# Patient Record
Sex: MALE | Race: WHITE | Employment: OTHER | URBAN - METROPOLITAN AREA
[De-identification: names, ages, dates, MRNs, and addresses within clinical notes are randomized per-mention and may not be internally consistent; named-entity substitution may affect disease eponyms.]

---

## 2017-07-11 ENCOUNTER — HOSPITAL ENCOUNTER (OUTPATIENT)
Age: 42
Discharge: HOME | End: 2017-07-11
Attending: EMERGENCY MEDICINE
Payer: COMMERCIAL

## 2017-07-11 VITALS
SYSTOLIC BLOOD PRESSURE: 135 MMHG | RESPIRATION RATE: 18 BRPM | DIASTOLIC BLOOD PRESSURE: 78 MMHG | OXYGEN SATURATION: 98 % | TEMPERATURE: 98 F | WEIGHT: 230 LBS | HEART RATE: 69 BPM | HEIGHT: 73 IN | BODY MASS INDEX: 30.48 KG/M2

## 2017-07-11 DIAGNOSIS — S93.402A MILD ANKLE SPRAIN, LEFT, INITIAL ENCOUNTER: Primary | ICD-10-CM

## 2017-07-11 PROCEDURE — 99212 OFFICE O/P EST SF 10 MIN: CPT

## 2017-07-11 NOTE — ED INITIAL ASSESSMENT (HPI)
REPORTS LEFT ANKLE PAIN AFTER WALKING STARTING YESTERDAY, DENIES ANY SPECIFIC TRAUMA OR INJURY. DENIES \"TWISTING OR ROLLING\" ANKLE. PATIENT STATES HE TOOK IBUPROFEN FOR PAIN BUT IT HAS NOT BEEN EFFECTIVE IN RELIEVING HIS PAIN.

## 2017-07-11 NOTE — ED PROVIDER NOTES
Patient Seen in: [address]    History   Patient presents with:  Lower Extremity Injury (musculoskeletal)    Stated Complaint: Lt ankle pain    HPI  Patient complains of left ankle pain laterally.   Patient denies discrete Constitutional: He is oriented to person, place, and time. He appears well-developed and well-nourished. HENT:   Head: Normocephalic. Eyes: EOM are normal.   Cardiovascular: Normal rate, regular rhythm and intact distal pulses.     Pulmonary/Chest: Effo

## 2019-02-15 ENCOUNTER — OFFICE VISIT (OUTPATIENT)
Dept: SLEEP CENTER | Age: 44
End: 2019-02-15
Attending: FAMILY MEDICINE
Payer: COMMERCIAL

## 2019-02-15 ENCOUNTER — ORDER TRANSCRIPTION (OUTPATIENT)
Dept: SLEEP CENTER | Age: 44
End: 2019-02-15

## 2019-02-15 DIAGNOSIS — G47.33 OSA (OBSTRUCTIVE SLEEP APNEA): Primary | ICD-10-CM

## 2019-02-15 DIAGNOSIS — Z76.89 SLEEP CONCERN: Primary | ICD-10-CM

## 2019-02-15 PROCEDURE — 95811 POLYSOM 6/>YRS CPAP 4/> PARM: CPT

## 2019-02-19 NOTE — PROCEDURES
[facility]  Accredited by the [employer] ([employer])    PATIENT'S NAME: Winnie Marx   ATTENDING PHYSICIAN: Kenneth Cruz. Rafaela Oquendo MD   REFERRING PHYSICIAN: Kenneth Cruz.  Rafaela Oquendo MD   PATIENT ACCOUNT #: [de-identified] LOCATION: [facility] study.      CONCLUSION:    1. Severe obstructive sleep apnea syndrome. 2.   Favorable nasal CPAP response of 11 cm water pressure was obtained. SUGGESTIONS AND RECOMMENDATIONS:    1.    Aggressive weight reduction program.  2.   Avoid alcohol, sedati

## 2022-04-27 ENCOUNTER — HOSPITAL ENCOUNTER (OUTPATIENT)
Age: 47
Discharge: HOME | End: 2022-04-27
Attending: EMERGENCY MEDICINE
Payer: COMMERCIAL

## 2022-04-27 VITALS
SYSTOLIC BLOOD PRESSURE: 150 MMHG | TEMPERATURE: 98 F | HEART RATE: 68 BPM | DIASTOLIC BLOOD PRESSURE: 82 MMHG | OXYGEN SATURATION: 99 % | RESPIRATION RATE: 16 BRPM

## 2022-04-27 DIAGNOSIS — J06.9 VIRAL URI: Primary | ICD-10-CM

## 2022-04-27 LAB
S PYO AG THROAT QL: NEGATIVE
SARS-COV-2 RNA RESP QL NAA+PROBE: NOT DETECTED

## 2022-04-27 PROCEDURE — 99202 OFFICE O/P NEW SF 15 MIN: CPT

## 2022-04-27 PROCEDURE — 87880 STREP A ASSAY W/OPTIC: CPT

## 2022-04-27 NOTE — ED INITIAL ASSESSMENT (HPI)
Pt presents with sore thraot x 4 days. No additional symptoms reported.      No OTC medication taken